# Patient Record
Sex: FEMALE | Race: WHITE
[De-identification: names, ages, dates, MRNs, and addresses within clinical notes are randomized per-mention and may not be internally consistent; named-entity substitution may affect disease eponyms.]

---

## 2019-03-04 ENCOUNTER — HOSPITAL ENCOUNTER (INPATIENT)
Dept: HOSPITAL 56 - MW.OB | Age: 24
LOS: 2 days | Discharge: HOME | End: 2019-03-06
Attending: OBSTETRICS & GYNECOLOGY | Admitting: OBSTETRICS & GYNECOLOGY
Payer: COMMERCIAL

## 2019-03-04 DIAGNOSIS — N85.8: ICD-10-CM

## 2019-03-04 DIAGNOSIS — G43.909: ICD-10-CM

## 2019-03-04 DIAGNOSIS — E66.01: ICD-10-CM

## 2019-03-04 DIAGNOSIS — O34.211: Primary | ICD-10-CM

## 2019-03-04 DIAGNOSIS — Z3A.39: ICD-10-CM

## 2019-03-04 PROCEDURE — 6A550ZT PHERESIS OF CORD BLOOD STEM CELLS, SINGLE: ICD-10-PCS | Performed by: OBSTETRICS & GYNECOLOGY

## 2019-03-04 NOTE — PCM.PREANE
Preanesthetic Assessment





- Anesthesia/Transfusion/Family Hx


Anesthesia History: Prior Anesthesia Without Reaction


Family History of Anesthesia Reaction: No


Transfusion History: No Prior Transfusion(s)


Intubation History: Unknown





- Review of Systems


General: No Symptoms


Pulmonary: No Symptoms


Cardiovascular: No Symptoms


Gastrointestinal: No Symptoms


Neurological: No Symptoms


Other: Reports: None





- Physical Assessment


Height: 1.6 m


Weight: 126.552 kg


ASA Class: 2


Mental Status: Alert & Oriented x3


Airway Class: Mallampati = 2


Dentition: Reports: Normal Dentition, Crown(s) (x1 lower right)


Thyro-Mental Finger Breadths: 3


Mouth Opening Finger Breadths: 3


ROM/Head Extension: Full


Lungs: Clear to Auscultation, Normal Respiratory Effort


Cardiovascular: Regular Rate, Regular Rhythm





- Lab


Values: 





 Laboratory Last Values











WBC  8.06 K/uL (4.0-11.0)   19  13:31    


 


RBC  4.27 M/uL (4.30-5.90)  L  19  13:31    


 


Hgb  11.5 g/dL (12.0-16.0)  L  19  13:31    


 


Hct  35.2 % (36.0-46.0)  L  19  13:31    


 


MCV  82.4 fL (80.0-98.0)   19  13:31    


 


MCH  26.9 pg (27.0-32.0)  L  19  13:31    


 


MCHC  32.7 g/dL (31.0-37.0)   19  13:31    


 


RDW Std Deviation  41.2 fl (28.0-62.0)   19  13:31    


 


RDW Coeff of Patricio  14 % (11.0-15.0)   19  13:31    


 


Plt Count  231 K/uL (150-400)   19  13:31    


 


MPV  11.00 fL (7.40-12.00)   19  13:31    


 


Nucleated RBC %  0.0 /100WBC  19  13:31    


 


Nucleated RBCs #  0 K/uL  19  13:31    


 


Blood Type  O POSITIVE   19  13:31    


 


Antibody Screen  NEGATIVE   19  13:31    














- Allergies


Allergies/Adverse Reactions: 


 Allergies











Allergy/AdvReac Type Severity Reaction Status Date / Time


 


No Known Allergies Allergy   Verified 19 12:09














- Blood


Blood Available: No





- Anesthesia Plan


Pre-Op Medication Ordered: None





- Acknowledgements


Anesthesia Type Planned: Spinal


Pt an Appropriate Candidate for the Planned Anesthesia: Yes


Alternatives and Risks of Anesthesia Discussed w Pt/Guardian: Yes


Pt/Guardian Understands and Agrees with Anesthesia Plan: Yes





PreAnesthesia Questionnaire





- Past Health History


Medical/Surgical History: Denies Medical/Surgical History


Gastrointestinal History: Reports: Other (See Below)


Other Gastrointestinal History: occasional heartburn with pregnancy


OB/GYN History: Reports: Pregnancy


Endocrine/Metabolic History: Reports: Obesity/BMI 30+





- Past Surgical History


Head Surgeries/Procedures: Reports: None


Female  Surgical History: Reports:  Section





- SUBSTANCE USE


Smoking Status *Q: Never Smoker


Second Hand Smoke Exposure: No


Recreational Drug Use History: No





- HOME MEDS


Home Medications: 


 Home Meds





PNV95/Ferrous Fumarate/FA [Prenatal Vitamin Tablet] 1 tab PO DAILY 19 [

History]











- CURRENT (IN HOUSE) MEDS


Current Meds: 





 Current Medications





Lactated Ringer's (Ringers, Lactated)  1,000 mls @ 500 mls/hr IV BOLUS ROBERT


   Last Admin: 19 06:56 Dose:  500 mls/hr


Oxytocin/Sodium Chloride (Oxytocin 30 Unit/500 Ml-Ns)  30 unit in 500 mls @ 250 

mls/hr IV TITRATE ROBERT


Sodium Chloride (Saline Flush)  10 ml FLUSH ASDIRECTED PRN


   PRN Reason: Keep Vein Open


Sodium Chloride (Saline Flush)  2.5 ml FLUSH ASDIRECTED PRN


   PRN Reason: Keep Vein Open





Discontinued Medications





Citric Acid/Sodium Citrate (Bicitra Solution)  30 ml PO ONETIME ONE


   Stop: 19 05:38


Cefazolin Sodium/Dextrose 2 gm (/ Premix)  50 mls @ 100 mls/hr IV ONETIME ONE


   Stop: 19 06:06

## 2019-03-04 NOTE — PCM.OPNOTE
- General Post-Op/Procedure Note


Date of Surgery/Procedure: 19


Operative Procedure(s): repeat low transverse 


Findings: 


Liveborn male 8/9 weight 3540 grams, normal pelvis





Pre Op Diagnosis: 39 5/7 weeks, prior , decline VTOL


Post-Op Diagnosis: Same


Anesthesia Technique: Spinal


Primary Surgeon: Génesis Spivey


Anesthesia Provider: Andrew Marcano


Assistant: Nicki Downing


Pathology: 





none





Fluid Replacement, Intraop: 2,225


Output, Urine Amount: 150


EBL in mLs: 700


Complications: None


Condition: Stable

## 2019-03-05 NOTE — PCM.PNPP
<RoxannaDulce - Last Filed: 19 08:03>





- General Info


Date of Service: 19


Functional Status: Reports: Pain Controlled, Tolerating Diet, Ambulating, 

Urinating





- Review of Systems


General: Denies: Fever, Weakness, Fatigue


Pulmonary: Denies: Shortness of Breath, Pleuritic Chest Pain, Cough


Cardiovascular: Denies: Chest Pain, Palpitations, Dyspnea on Exertion


Gastrointestinal: Denies: Abdominal Pain


Genitourinary: Denies: Dysuria





- General Info


Date of Service: 19





- Patient Data


Vital Signs - Most Recent: 


 Last Vital Signs











Temp  36.6 C   19 04:10


 


Pulse  90   19 06:00


 


Resp  18   19 07:00


 


BP  113/63   19 04:10


 


Pulse Ox  97   19 07:00











Weight - Most Recent: 126.552 kg


I&O - Last 24 Hours: 


 Intake & Output











 19





 22:59 06:59 14:59


 


Intake Total 1837  


 


Output Total 450 1800 


 


Balance 1387 -1800 











Lab Results - Last 24 Hours: 


 Laboratory Results - last 24 hr











  19 Range/Units





  08:24 05:00 


 


Hgb   10.3 L  (12.0-16.0)  g/dL


 


Hct   30.8 L  (36.0-46.0)  %


 


Cord ABG pH  7.300   (7.18-7.38)  


 


Cord ABG Base Excess  -5   (-10--2)  


 


Cord VBG pH  7.353   (7.25-7.45)  


 


Cord VBG Base Excess  -5   (-10--2)  











Med Orders - Current: 


 Current Medications





Acetaminophen (Tylenol)  650 mg PO Q4H PRN


   PRN Reason: Pain


   Last Admin: 19 06:17 Dose:  650 mg


Bisacodyl (Dulcolax)  10 mg RECTAL ONETIME PRN


   PRN Reason: Constipation


Diphenhydramine HCl (Benadryl)  25 mg IVPUSH Q6H PRN


   PRN Reason: Itching or Nausea


Docusate Sodium (Colace)  100 mg PO BID ROBERT


   Last Admin: 19 21:14 Dose:  100 mg


Emollient Ointment (Lansinoh Hpa)  0 gm TOP ASDIRECTED PRN


   PRN Reason: Sore Nipples


Fentanyl (Sublimaze)  50 mcg IVPUSH Q1H PRN


   PRN Reason: Pain (severe 7-10)


Fentanyl (Sublimaze)  50 mcg IVPUSH Q5M PRN


   PRN Reason: Pain (severe 7-10)


   Stop: 19 08:49


Lactated Ringer's (Ringers, Lactated)  1,000 mls @ 125 mls/hr IV ASDIRECTED ROBERT


   Last Admin: 19 13:06 Dose:  125 mls/hr


Ibuprofen (Motrin)  800 mg PO Q8H PRN


   PRN Reason: mild pain or fever


   Last Admin: 19 04:24 Dose:  800 mg


Ondansetron HCl (Zofran)  4 mg IVPUSH Q6H PRN


   PRN Reason: Nausea


Ondansetron HCl (Zofran)  4 mg IVPUSH Q4H PRN


   PRN Reason: Nausea/Vomiting


Oxycodone HCl (Oxycodone)  5 mg PO Q2H PRN


   PRN Reason: Pain


Oxycodone/Acetaminophen (Percocet 325-5 Mg)  2 tab PO Q6H PRN


   PRN Reason: Pain (moderate 4-6)


Sodium Chloride (Saline Flush)  10 ml FLUSH ASDIRECTED PRN


   PRN Reason: Keep Vein Open


Sodium Chloride (Saline Flush)  2.5 ml FLUSH ASDIRECTED PRN


   PRN Reason: Keep Vein Open


Sodium Chloride (Normal Saline)  10 ml IV ASDIRECTED PRN


   PRN Reason: Other





Discontinued Medications





Citric Acid/Sodium Citrate (Bicitra Solution)  30 ml PO ONETIME ONE


   Stop: 19 05:38


   Last Admin: 19 16:38 Dose:  Not Given


Citric Acid/Sodium Citrate (Bicitra Solution) Confirm Administered Dose 30 ml 

.ROUTE .STK-MED ONE


   Stop: 19 07:50


   Last Admin: 19 07:48 Dose:  30 ml


Diphenhydramine HCl (Benadryl)  25 mg IVPUSH Q4H PRN


   PRN Reason: Itching


   Stop: 19 07:11


Enoxaparin Sodium (Lovenox)  30 mg SUBCUT ONETIME ONE


   Stop: 19 15:01


   Last Admin: 19 14:51 Dose:  30 mg


Hydromorphone HCl (Dilaudid)  2 mg IVPUSH ONETIME ONE


   Stop: 19 08:50


   Last Admin: 19 16:37 Dose:  Not Given


Cefazolin Sodium/Dextrose 2 gm (/ Premix)  50 mls @ 100 mls/hr IV ONETIME ONE


   Stop: 19 06:06


   Last Admin: 19 16:38 Dose:  Not Given


Lactated Ringer's (Ringers, Lactated)  1,000 mls @ 500 mls/hr IV BOLUS ROBERT


   Last Admin: 19 06:56 Dose:  500 mls/hr


Oxytocin/Sodium Chloride (Oxytocin 30 Unit/500 Ml-Ns)  30 unit in 500 mls @ 250 

mls/hr IV TITRATE ROBERT


Nalbuphine HCl (Nubain)  5 mg IVPUSH ASDIRECTED PRN


   PRN Reason: Itching


   Last Admin: 19 16:34 Dose:  5 mg


Octyl Cyanoacrylate (Dermabond Advance) Confirm Administered Dose 2 applic 

.ROUTE .STK-MED ONE


   Stop: 19 07:42


Sodium Chloride (Saline Flush)  10 ml FLUSH ASDIRECTED PRN


   PRN Reason: Keep Vein Open


Sodium Chloride (Saline Flush)  2.5 ml FLUSH ASDIRECTED PRN


   PRN Reason: Keep Vein Open











- Infant Interaction


Infant Disposition, Postpartum:  in Room with Family


Infant Feeding:  Infant; Nursed Well


Support Person: 





- Postpartum Recovery Exam


Fundal Tone: Firm


Fundal Level: 1 Fingerbreadths Below Umbilicus


Fundal Placement: Midline


Lochia Amount: Small


Lochia Color: Rubra/Red


Perineum Description: Intact, Minimal Bruising/Swelling


Episiotomy/Laceration: None


Bladder Status: Indwelling Catheter in Place


Urinary Elimination: Indwelling Catheter





- Exam


General: Alert, Oriented


Neck: Supple


Lungs: Clear to Auscultation, Normal Respiratory Effort


Cardiovascular: Regular Rate, Regular Rhythm


GI/Abdominal Exam: Normal Bowel Sounds, Soft, Non-Tender, No Mass


Extremities: Normal Inspection, Non-Tender, Normal Capillary Refill, Pedal 

Edema (trace)


Skin: Warm, Dry, Intact





- Problem List & Annotations


(1)  delivery delivered


SNOMED Code(s): 797369395


   Code(s): O82 - ENCOUNTER FOR  DELIVERY WITHOUT INDICATION   Status: 

Acute   Current Visit: Yes   





- Problem List Review


Problem List Initiated/Reviewed/Updated: Yes





- Assessment


Assessment:: 


POD #1 s/p RLTCS. Minimal pain and lochia. Breast feeding well. Encouraged 

patient to shower and ambulate halls.








- Plan


Plan:: 


Continue routine post-op cares. Anticipate discharge home tomorrow.








<Génesis Spivey - Last Filed: 19 10:04>





- Patient Data


Vital Signs - Most Recent: 


 Last Vital Signs











Temp  36.4 C   19 07:30


 


Pulse  87   19 07:30


 


Resp  18   19 07:30


 


BP  123/68   19 07:30


 


Pulse Ox  96   19 07:30











I&O - Last 24 Hours: 


 Intake & Output











 19





 22:59 06:59 14:59


 


Intake Total 1837  


 


Output Total 450 1800 


 


Balance 1387 -1800 











Lab Results - Last 24 Hours: 


 Laboratory Results - last 24 hr











  19 Range/Units





  05:00 


 


Hgb  10.3 L  (12.0-16.0)  g/dL


 


Hct  30.8 L  (36.0-46.0)  %











Med Orders - Current: 


 Current Medications





Acetaminophen (Tylenol)  650 mg PO Q4H PRN


   PRN Reason: Pain


   Last Admin: 19 06:17 Dose:  650 mg


Bisacodyl (Dulcolax)  10 mg RECTAL ONETIME PRN


   PRN Reason: Constipation


Diphenhydramine HCl (Benadryl)  25 mg IVPUSH Q6H PRN


   PRN Reason: Itching or Nausea


Docusate Sodium (Colace)  100 mg PO BID [facility]


   Last Admin: 19 21:14 Dose:  100 mg


Emollient Ointment (Lansinoh Hpa)  0 gm TOP ASDIRECTED PRN


   PRN Reason: Sore Nipples


Fentanyl (Sublimaze)  50 mcg IVPUSH Q1H PRN


   PRN Reason: Pain (severe 7-10)


Lactated Ringer's (Ringers, Lactated)  1,000 mls @ 125 mls/hr IV ASDIRECTED [facility]


   Last Admin: 19 13:06 Dose:  125 mls/hr


Ibuprofen (Motrin)  800 mg PO Q8H PRN


   PRN Reason: mild pain or fever


   Last Admin: 19 04:24 Dose:  800 mg


Ondansetron HCl (Zofran)  4 mg IVPUSH Q6H PRN


   PRN Reason: Nausea


Ondansetron HCl (Zofran)  4 mg IVPUSH Q4H PRN


   PRN Reason: Nausea/Vomiting


Oxycodone HCl (Oxycodone)  5 mg PO Q2H PRN


   PRN Reason: Pain


Oxycodone/Acetaminophen (Percocet 325-5 Mg)  2 tab PO Q6H PRN


   PRN Reason: Pain (moderate 4-6)


Sodium Chloride (Saline Flush)  10 ml FLUSH ASDIRECTED PRN


   PRN Reason: Keep Vein Open


Sodium Chloride (Saline Flush)  2.5 ml FLUSH ASDIRECTED PRN


   PRN Reason: Keep Vein Open


Sodium Chloride (Normal Saline)  10 ml IV ASDIRECTED PRN


   PRN Reason: Other





Discontinued Medications





Bupivacaine HCl (Sensorcaine-Mpf 0.25%) Confirm Administered Dose 10 ml .ROUTE 

.STK-MED ONE


   Stop: 19 09:33


Citric Acid/Sodium Citrate (Bicitra Solution)  30 ml PO ONETIME ONE


   Stop: 19 05:38


   Last Admin: 19 16:38 Dose:  Not Given


Citric Acid/Sodium Citrate (Bicitra Solution) Confirm Administered Dose 30 ml 

.ROUTE .STK-MED ONE


   Stop: 19 07:50


   Last Admin: 19 07:48 Dose:  30 ml


Diphenhydramine HCl (Benadryl)  25 mg IVPUSH Q4H PRN


   PRN Reason: Itching


   Stop: 19 07:11


Enoxaparin Sodium (Lovenox)  30 mg SUBCUT ONETIME ONE


   Stop: 19 15:01


   Last Admin: 19 14:51 Dose:  30 mg


Fentanyl (Sublimaze)  50 mcg IVPUSH Q5M PRN


   PRN Reason: Pain (severe 7-10)


   Stop: 19 08:49


Hydromorphone HCl (Dilaudid)  2 mg IVPUSH ONETIME ONE


   Stop: 19 08:50


   Last Admin: 19 16:37 Dose:  Not Given


Cefazolin Sodium/Dextrose 2 gm (/ Premix)  50 mls @ 100 mls/hr IV ONETIME ONE


   Stop: 19 06:06


   Last Admin: 19 16:38 Dose:  Not Given


Lactated Ringer's (Ringers, Lactated)  1,000 mls @ 500 mls/hr IV BOLUS ROBERT


   Last Admin: 19 06:56 Dose:  500 mls/hr


Oxytocin/Sodium Chloride (Oxytocin 30 Unit/500 Ml-Ns)  30 unit in 500 mls @ 250 

mls/hr IV TITRATE ROBERT


Nalbuphine HCl (Nubain)  5 mg IVPUSH ASDIRECTED PRN


   PRN Reason: Itching


   Last Admin: 19 16:34 Dose:  5 mg


Octyl Cyanoacrylate (Dermabond Advance) Confirm Administered Dose 2 applic 

.ROUTE .STK-MED ONE


   Stop: 19 07:42


Ropivacaine (Naropin 0.2%) Confirm Administered Dose 20 ml .ROUTE .STK-MED ONE


   Stop: 19 09:32


Sodium Chloride (Saline Flush)  10 ml FLUSH ASDIRECTED PRN


   PRN Reason: Keep Vein Open


Sodium Chloride (Saline Flush)  2.5 ml FLUSH ASDIRECTED PRN


   PRN Reason: Keep Vein Open











- Problem List Review


Problem List Initiated/Reviewed/Updated: Yes





- My Orders


Last 24 Hours: 


My Active Orders





19 09:19


Bisacodyl [Dulcolax]   10 mg RECTAL ONETIME PRN 


Ibuprofen [Motrin]   800 mg PO Q8H PRN 


Lanolin [Lansinoh HPA]   See Dose Instructions  TOP ASDIRECTED PRN 


Ondansetron [Zofran]   4 mg IVPUSH Q4H PRN 


diphenhydrAMINE [Benadryl]   25 mg IVPUSH Q6H PRN 


Abdominal Binder [OM.PC] Urgent 


Resuscitation Status Routine 





19 09:20


Patient Status [ADT] Routine 


Ambulate [RC] PER UNIT ROUTINE 


Antiembolic Devices [RC] PER UNIT ROUTINE 


Communication Order [RC] PER UNIT ROUTINE 


Communication Order [RC] Per Unit Routine 


Intake and Output [RC] Q12H 


May Shower [RC] ASDIRECTED 


Notify Provider Vital Signs [RC] ASDIRECTED 


RT Incentive Spirometry [RC] Q2HWA 


Vital Signs [RC] PER UNIT ROUTINE 


Assess Lochia [WOMSER] Per Unit Routine 


Assess Uterine Involution [WOMSER] Per Unit Routine 


Breast Pump [WOMSER] Per Unit Routine 


Peripheral IV Discontinue [OM.PC] Routine 


Sequential Compression Device [OM.PC] Per Unit Routine 





19 09:22


Notify Provider Intake and Out [RC] ASDIRECTED 





19 09:25


Acetaminophen [Tylenol]   650 mg PO Q4H PRN 


oxyCODONE   5 mg PO Q2H PRN 





19 09:30


Lactated Ringers [Ringers, Lactated] 1,000 ml IV ASDIRECTED 





19 21:00


Docusate Sodium [Colace]   100 mg PO BID 





19 Dinner


Regular Diet [DIET] 





19 Lunch


Regular Diet [DIET] 














- Assessment


Assessment:: 





patient was seen and examined and I agree with above.

## 2019-03-05 NOTE — OR
SURGEON:

Génesis Spivey M.D.

 

DATE OF PROCEDURE:  2019

 

PREOPERATIVE DIAGNOSES:

1. 39 and 5/7 week intrauterine pregnancy.

2. Prior  delivery, declines vaginal trial of labor.

 

POSTOPERATIVE DIAGNOSES:

1. 39 and 5/7 week intrauterine pregnancy.

2. Prior  delivery, declines vaginal trial of labor.

 

PROCEDURE:

Repeat low-transverse  section.

 

ANESTHESIA:

Spinal.

 

ESTIMATED BLOOD LOSS:

700 mL.

 

FLUIDS:

2225 mL crystalloid.  Urine output 150 mL.

 

FINDINGS:

Live-born male.  Apgar scores 8 and 9, weighing 3540 g.  Normal-appearing

uterus, tubes, and ovaries.

 

COMPLICATIONS:

None known.

 

DISPOSITION:

Stable to recovery.

 

BRIEF HISTORY:

This is a 23-year-old female.  She is G2, P1-0-0-1.  She presents at 39 and 5/7

weeks gestation for a repeat  delivery.  She has had uncomplicated

prenatal care, except for morbid obesity.  She was consented, including risks of

bleeding; infection; injury to bowel, bladder, blood vessels, ureters, or other

organs; risk of thromboembolic event; risk of anesthesia.  Understanding all

these risks, she does desire to proceed.

 

DESCRIPTION OF PROCEDURE:

With the patient in left tilt position, under adequate spinal analgesia, the

abdomen was prepped with chlorhexidine and draped in usual fashion for abdominal

surgery.  SCDs were in place.  She had received 2 g of Ancef IV and an

appropriate time-out was held.  After documentation of adequate analgesia, the

prior cicatrix was excised and a scalpel was used to incise transversely 2 cm

cephalad from the pubic symphysis through subcutaneous tissue to the fascia,

which was scored transversely in the midline.  The fascial incision was extended

laterally using curved Flores scissors.  The fascia was elevated from the

underlying rectus muscle using sharp and blunt dissection.  The rectus muscles

were  in the midline.  A finger was used to enter the peritoneal

cavity.  There were no anterior adhesions.  Therefore, the incision was extended

using sharp and blunt dissection.  The Alexx O  retractor was placed.

The visceroperitoneum over the lower uterine segment was incised and an adequate

bladder flap was developed.  A transverse curvilinear incision was made over the

lower uterine segment.  The amniotic cavity was entered, clear fluid was noted.

The incision was extended using blunt dissection.  The fetal head was delivered

via the uterine incision with fundal pressure.  The infant was bulb suctioned by

nose and mouth, and the remainder of the infant's body was delivered.  The cord

was doubly clamped and cut, and the infant was handed to the nurse in attendance

at delivery.  The infant is a liveborn male, Apgar scores 8 and 9, weighing 3540

g.  Cord blood was collected for cord ABGs, as well as routine cord blood

sampling.  The placenta was removed by manual extraction.  The cervix was opened

with a ring forceps.  The uterine incision was closed with running lock suture

of 0 Polysorb, followed by an imbricating layer of 0 Polysorb.  Tubes and

ovaries were inspected and were normal.  The uterine incision was inspected and

1 additional suture was placed for complete hemostasis.  The uterine incision

was completely hemostatic; therefore, the Alexx O  retractor was

removed.  The uterine incision was again carefully inspected and was hemostatic.

The fundus was firm, and therefore, the rectus muscle and peritoneum were

loosely approximated in the midline using a running mattress suture of 0

Polysorb.  Posterior aspect of the fascia was inspected and any areas of

bleeding that were noted were cauterized.  The fascial incision was closed with

a running suture of 0 Polysorb.  Subcutaneous tissue was irrigated.  Any areas

of bleeding that were noted were cauterized, and the deep subcutaneous tissue

was closed with a running suture of 3-0 plain.  The skin was closed with

subcuticular suture of 3-0 Monocryl followed by Dermabond.  Final sponge,

needle, and instrument counts were reported as correct.  There were no

complications.  Mother and baby are in recovery in good condition.

 

 

ISABELLE / NICHOL

DD:  2019 09:29:[id number]

DT:  2019 15:42:43

Job #:  [id number]

## 2019-03-06 NOTE — PCM.DCSUM1
**Discharge Summary





- Hospital Course


Diagnosis: Stroke: No





- Discharge Data


Discharge Disposition: Home, Self-Care 01


Condition: Stable





- Discharge Diagnosis/Problem(s)


(1)  delivery delivered


SNOMED Code(s): 952337779


   ICD Code: O82 - ENCOUNTER FOR  DELIVERY WITHOUT INDICATION   Status: 

Acute   Current Visit: Yes   





- Patient Summary/Data


Operative Procedure(s) Performed: repeat low transverse 





- Patient Instructions


Diet: Heart Healthy Diet, Regular Diet as Tolerated


Activity: No Lifting Over 10 Pounds, No Strenuous Activities


Driving: Do Not Drive


Showering/Bathing: May Shower


Wound/Incision Care: Keep Operative Site/Wound Site Clean and Dry


Notify Provider of: Fever, Increased Pain, Swelling and Redness


Other/Special Instructions: Notify of bleeding greater than a pad per hour x 2 

hours.  Nothing per vagina for 6 weeks.  Use meds as discussed with Dr Spivey.





- Discharge Plan


*PRESCRIPTION DRUG MONITORING PROGRAM REVIEWED*: Not Applicable


*COPY OF PRESCRIPTION DRUG MONITORING REPORT IN PATIENT JACKIE: Not Applicable


Home Medications: 


 Home Meds





PNV95/Ferrous Fumarate/FA [Prenatal Vitamin Tablet] 1 tab PO DAILY 19 [

History]








Patient Handouts:  Postpartum Care After  Delivery


Referrals: 


[facility] [Outside]


Génesis Spivey MD [Physician] - 


(2 week-  @ 2:45pm w/ Dr. Spivey


 week-  @ 11:15am w/ Dr. Spivey


)





- Discharge Summary/Plan Comment


DC Time >30 min.: No





- Patient Data


Vitals - Most Recent: 


 Last Vital Signs











Temp  36.8 C   19 07:40


 


Pulse  82   19 07:40


 


Resp  16   19 07:40


 


BP  121/66   19 07:40


 


Pulse Ox  98   19 07:40











Weight - Most Recent: 126.552 kg


Med Orders - Current: 


 Current Medications





Acetaminophen (Tylenol)  650 mg PO Q4H PRN


   PRN Reason: Pain


   Last Admin: 19 16:00 Dose:  650 mg


Bisacodyl (Dulcolax)  10 mg RECTAL ONETIME PRN


   PRN Reason: Constipation


Diphenhydramine HCl (Benadryl)  25 mg IVPUSH Q6H PRN


   PRN Reason: Itching or Nausea


Docusate Sodium (Colace)  100 mg PO BID [facility]


   Last Admin: 19 08:13 Dose:  100 mg


Emollient Ointment (Lansinoh Hpa)  0 gm TOP ASDIRECTED PRN


   PRN Reason: Sore Nipples


   Last Admin: 19 06:38 Dose:  7 gm


Fentanyl (Sublimaze)  50 mcg IVPUSH Q1H PRN


   PRN Reason: Pain (severe 7-10)


Lactated Ringer's (Ringers, Lactated)  1,000 mls @ 125 mls/hr IV ASDIRECTED [facility]


   Last Admin: 19 13:06 Dose:  125 mls/hr


Ibuprofen (Motrin)  800 mg PO Q8H PRN


   PRN Reason: mild pain or fever


   Last Admin: 19 08:13 Dose:  800 mg


Ondansetron HCl (Zofran)  4 mg IVPUSH Q6H PRN


   PRN Reason: Nausea


Ondansetron HCl (Zofran)  4 mg IVPUSH Q4H PRN


   PRN Reason: Nausea/Vomiting


Oxycodone HCl (Oxycodone)  5 mg PO Q2H PRN


   PRN Reason: Pain


   Last Admin: 19 03:44 Dose:  5 mg


Oxycodone/Acetaminophen (Percocet 325-5 Mg)  2 tab PO Q6H PRN


   PRN Reason: Pain (moderate 4-6)


Sodium Chloride (Saline Flush)  10 ml FLUSH ASDIRECTED PRN


   PRN Reason: Keep Vein Open


Sodium Chloride (Saline Flush)  2.5 ml FLUSH ASDIRECTED PRN


   PRN Reason: Keep Vein Open


Sodium Chloride (Normal Saline)  10 ml IV ASDIRECTED PRN


   PRN Reason: Other





Discontinued Medications





Bupivacaine HCl (Sensorcaine-Mpf 0.25%) Confirm Administered Dose 10 ml .ROUTE 

.STK-MED ONE


   Stop: 19 09:33


   Last Admin: 19 07:34 Dose:  Not Given


Citric Acid/Sodium Citrate (Bicitra Solution)  30 ml PO ONETIME ONE


   Stop: 19 05:38


   Last Admin: 19 16:38 Dose:  Not Given


Citric Acid/Sodium Citrate (Bicitra Solution) Confirm Administered Dose 30 ml 

.ROUTE .STK-MED ONE


   Stop: 19 07:50


   Last Admin: 19 07:48 Dose:  30 ml


Diphenhydramine HCl (Benadryl)  25 mg IVPUSH Q4H PRN


   PRN Reason: Itching


   Stop: 19 07:11


Enoxaparin Sodium (Lovenox)  30 mg SUBCUT ONETIME ONE


   Stop: 19 15:01


   Last Admin: 19 14:51 Dose:  30 mg


Fentanyl (Sublimaze)  50 mcg IVPUSH Q5M PRN


   PRN Reason: Pain (severe 7-10)


   Stop: 19 08:49


Hydromorphone HCl (Dilaudid)  2 mg IVPUSH ONETIME ONE


   Stop: 19 08:50


   Last Admin: 19 16:37 Dose:  Not Given


Cefazolin Sodium/Dextrose 2 gm (/ Premix)  50 mls @ 100 mls/hr IV ONETIME ONE


   Stop: 19 06:06


   Last Admin: 19 16:38 Dose:  Not Given


Lactated Ringer's (Ringers, Lactated)  1,000 mls @ 500 mls/hr IV BOLUS ROBERT


   Last Admin: 19 06:56 Dose:  500 mls/hr


Oxytocin/Sodium Chloride (Oxytocin 30 Unit/500 Ml-Ns)  30 unit in 500 mls @ 250 

mls/hr IV TITRATE ROBERT


Nalbuphine HCl (Nubain)  5 mg IVPUSH ASDIRECTED PRN


   PRN Reason: Itching


   Last Admin: 19 16:34 Dose:  5 mg


Octyl Cyanoacrylate (Dermabond Advance) Confirm Administered Dose 2 applic 

.ROUTE .STK-MED ONE


   Stop: 19 07:42


Ropivacaine (Naropin 0.2%) Confirm Administered Dose 20 ml .ROUTE .STK-MED ONE


   Stop: 19 09:32


   Last Admin: 19 07:34 Dose:  Not Given


Sodium Chloride (Saline Flush)  10 ml FLUSH ASDIRECTED PRN


   PRN Reason: Keep Vein Open


Sodium Chloride (Saline Flush)  2.5 ml FLUSH ASDIRECTED PRN


   PRN Reason: Keep Vein Open

## 2019-03-06 NOTE — PCM.PNPP
- General Info


Functional Status: Reports: Pain Controlled, Tolerating Diet, Ambulating, 

Urinating





- Patient Data


Vital Signs - Most Recent: 


 Last Vital Signs











Temp  36.8 C   19 07:40


 


Pulse  82   19 07:40


 


Resp  16   19 07:40


 


BP  121/66   19 07:40


 


Pulse Ox  98   19 07:40











Weight - Most Recent: 126.552 kg


Med Orders - Current: 


 Current Medications





Acetaminophen (Tylenol)  650 mg PO Q4H PRN


   PRN Reason: Pain


   Last Admin: 19 16:00 Dose:  650 mg


Bisacodyl (Dulcolax)  10 mg RECTAL ONETIME PRN


   PRN Reason: Constipation


Diphenhydramine HCl (Benadryl)  25 mg IVPUSH Q6H PRN


   PRN Reason: Itching or Nausea


Docusate Sodium (Colace)  100 mg PO BID ROBERT


   Last Admin: 19 08:13 Dose:  100 mg


Emollient Ointment (Lansinoh Hpa)  0 gm TOP ASDIRECTED PRN


   PRN Reason: Sore Nipples


   Last Admin: 19 06:38 Dose:  7 gm


Fentanyl (Sublimaze)  50 mcg IVPUSH Q1H PRN


   PRN Reason: Pain (severe 7-10)


Lactated Ringer's (Ringers, Lactated)  1,000 mls @ 125 mls/hr IV ASDIRECTED ROBERT


   Last Admin: 19 13:06 Dose:  125 mls/hr


Ibuprofen (Motrin)  800 mg PO Q8H PRN


   PRN Reason: mild pain or fever


   Last Admin: 19 08:13 Dose:  800 mg


Ondansetron HCl (Zofran)  4 mg IVPUSH Q6H PRN


   PRN Reason: Nausea


Ondansetron HCl (Zofran)  4 mg IVPUSH Q4H PRN


   PRN Reason: Nausea/Vomiting


Oxycodone HCl (Oxycodone)  5 mg PO Q2H PRN


   PRN Reason: Pain


   Last Admin: 19 03:44 Dose:  5 mg


Oxycodone/Acetaminophen (Percocet 325-5 Mg)  2 tab PO Q6H PRN


   PRN Reason: Pain (moderate 4-6)


Sodium Chloride (Saline Flush)  10 ml FLUSH ASDIRECTED PRN


   PRN Reason: Keep Vein Open


Sodium Chloride (Saline Flush)  2.5 ml FLUSH ASDIRECTED PRN


   PRN Reason: Keep Vein Open


Sodium Chloride (Normal Saline)  10 ml IV ASDIRECTED PRN


   PRN Reason: Other





Discontinued Medications





Bupivacaine HCl (Sensorcaine-Mpf 0.25%) Confirm Administered Dose 10 ml .ROUTE 

.STK-MED ONE


   Stop: 19 09:33


   Last Admin: 19 07:34 Dose:  Not Given


Citric Acid/Sodium Citrate (Bicitra Solution)  30 ml PO ONETIME ONE


   Stop: 19 05:38


   Last Admin: 19 16:38 Dose:  Not Given


Citric Acid/Sodium Citrate (Bicitra Solution) Confirm Administered Dose 30 ml 

.ROUTE .STK-MED ONE


   Stop: 19 07:50


   Last Admin: 19 07:48 Dose:  30 ml


Diphenhydramine HCl (Benadryl)  25 mg IVPUSH Q4H PRN


   PRN Reason: Itching


   Stop: 19 07:11


Enoxaparin Sodium (Lovenox)  30 mg SUBCUT ONETIME ONE


   Stop: 19 15:01


   Last Admin: 19 14:51 Dose:  30 mg


Fentanyl (Sublimaze)  50 mcg IVPUSH Q5M PRN


   PRN Reason: Pain (severe 7-10)


   Stop: 19 08:49


Hydromorphone HCl (Dilaudid)  2 mg IVPUSH ONETIME ONE


   Stop: 19 08:50


   Last Admin: 19 16:37 Dose:  Not Given


Cefazolin Sodium/Dextrose 2 gm (/ Premix)  50 mls @ 100 mls/hr IV ONETIME ONE


   Stop: 19 06:06


   Last Admin: 19 16:38 Dose:  Not Given


Lactated Ringer's (Ringers, Lactated)  1,000 mls @ 500 mls/hr IV BOLUS ROBERT


   Last Admin: 19 06:56 Dose:  500 mls/hr


Oxytocin/Sodium Chloride (Oxytocin 30 Unit/500 Ml-Ns)  30 unit in 500 mls @ 250 

mls/hr IV TITRATE ROBERT


Nalbuphine HCl (Nubain)  5 mg IVPUSH ASDIRECTED PRN


   PRN Reason: Itching


   Last Admin: 19 16:34 Dose:  5 mg


Octyl Cyanoacrylate (Dermabond Advance) Confirm Administered Dose 2 applic 

.ROUTE .STK-MED ONE


   Stop: 19 07:42


Ropivacaine (Naropin 0.2%) Confirm Administered Dose 20 ml .ROUTE .STK-MED ONE


   Stop: 19 09:32


   Last Admin: 19 07:34 Dose:  Not Given


Sodium Chloride (Saline Flush)  10 ml FLUSH ASDIRECTED PRN


   PRN Reason: Keep Vein Open


Sodium Chloride (Saline Flush)  2.5 ml FLUSH ASDIRECTED PRN


   PRN Reason: Keep Vein Open











- Infant Interaction


Infant Disposition, Postpartum:  in Room with Family


Infant Feeding:  Infant; Nursed Well


Support Person: 





- Postpartum Recovery Exam


Fundal Tone: Firm


Fundal Level: 1 Fingerbreadths Below Umbilicus


Fundal Placement: Midline


Lochia Amount: Small


Lochia Color: Rubra/Red


Perineum Description: Intact, Minimal Bruising/Swelling


Episiotomy/Laceration: None


Bladder Status: Voiding


Urinary Elimination: Indwelling Catheter





- Exam


General: Alert, Oriented


Lungs: Clear to Auscultation, Normal Respiratory Effort


Cardiovascular: Regular Rate


GI/Abdominal Exam: Normal Bowel Sounds, Soft, Non-Tender, No Distention


Extremities: Non-Tender, No Pedal Edema


Skin: Warm, Dry, Intact


Psy/Mental Status: Alert, Normal Affect, Normal Mood





- Problem List & Annotations


(1)  delivery delivered


SNOMED Code(s): 591385278


   Code(s): O82 - ENCOUNTER FOR  DELIVERY WITHOUT INDICATION   Status: 

Acute   Current Visit: Yes   





- Problem List Review


Problem List Initiated/Reviewed/Updated: Yes

## 2019-03-06 NOTE — PCM.DCSUM1
**Discharge Summary





- Hospital Course


Free Text/Narrative:: 





Pt is POD#2 from repeat .  Exam is normal.  Instructions given.  she 

requests discharge today.  She requests OTC medications only.


Diagnosis: Stroke: No





- Discharge Data


Discharge Date: 19


Discharge Disposition: Home, Self-Care 01


Condition: Stable





- Discharge Diagnosis/Problem(s)


(1)  delivery delivered


SNOMED Code(s): 086105033


   ICD Code: O82 - ENCOUNTER FOR  DELIVERY WITHOUT INDICATION   Status: 

Acute   





- Patient Summary/Data


Operative Procedure(s) Performed: repeat low transverse 





- Patient Instructions


Diet: Heart Healthy Diet, Regular Diet as Tolerated


Activity: No Lifting Over 10 Pounds, No Strenuous Activities


Driving: Do Not Drive


Showering/Bathing: May Shower


Wound/Incision Care: Keep Operative Site/Wound Site Clean and Dry


Notify Provider of: Fever, Increased Pain, Swelling and Redness


Other/Special Instructions: Notify of bleeding greater than a pad per hour x 2 

hours.  Nothing per vagina for 6 weeks.  Use meds as discussed with Dr Spivey.





- Discharge Plan


*PRESCRIPTION DRUG MONITORING PROGRAM REVIEWED*: Not Applicable


*COPY OF PRESCRIPTION DRUG MONITORING REPORT IN PATIENT JACKIE: Not Applicable


Home Medications: 


 Home Meds





PNV95/Ferrous Fumarate/FA [Prenatal Vitamin Tablet] 1 tab PO DAILY 19 [

History]








Patient Handouts:  Home Care Instructions for Mom, Postpartum Care After 

 Delivery


Referrals: 


[facility] [Outside]


Génesis Spivey MD [Physician] - 


( week-  @ 2:45pm w/ Dr. Spivey


6 week-  @ 11:15am w/ Dr. Spivey


)





- Discharge Summary/Plan Comment


DC Time >30 min.: No





- General Info


Date of Service: 19





- Review of Systems


General: Reports: No Symptoms


HEENT: Reports: No Symptoms


Pulmonary: Reports: No Symptoms


Cardiovascular: Reports: No Symptoms


Gastrointestinal: Reports: No Symptoms


Genitourinary: Reports: No Symptoms


Musculoskeletal: Reports: No Symptoms


Skin: Reports: No Symptoms


Neurological: Reports: No Symptoms


Psychiatric: Reports: No Symptoms





- Patient Data


Vitals - Most Recent: 


 Last Vital Signs











Temp  36.8 C   19 07:40


 


Pulse  82   19 07:40


 


Resp  16   19 07:40


 


BP  121/66   19 07:40


 


Pulse Ox  98   19 07:40











Weight - Most Recent: 126.552 kg


Med Orders - Current: 


 Current Medications





Acetaminophen (Tylenol)  650 mg PO Q4H PRN


   PRN Reason: Pain


   Last Admin: 19 16:00 Dose:  650 mg


Bisacodyl (Dulcolax)  10 mg RECTAL ONETIME PRN


   PRN Reason: Constipation


Diphenhydramine HCl (Benadryl)  25 mg IVPUSH Q6H PRN


   PRN Reason: Itching or Nausea


Docusate Sodium (Colace)  100 mg PO BID [facility]


   Last Admin: 19 08:13 Dose:  100 mg


Emollient Ointment (Lansinoh Hpa)  0 gm TOP ASDIRECTED PRN


   PRN Reason: Sore Nipples


   Last Admin: 19 06:38 Dose:  7 gm


Fentanyl (Sublimaze)  50 mcg IVPUSH Q1H PRN


   PRN Reason: Pain (severe 7-10)


Lactated Ringer's (Ringers, Lactated)  1,000 mls @ 125 mls/hr IV ASDIRECTED [facility]


   Last Admin: 19 13:06 Dose:  125 mls/hr


Ibuprofen (Motrin)  800 mg PO Q8H PRN


   PRN Reason: mild pain or fever


   Last Admin: 19 08:13 Dose:  800 mg


Ondansetron HCl (Zofran)  4 mg IVPUSH Q6H PRN


   PRN Reason: Nausea


Ondansetron HCl (Zofran)  4 mg IVPUSH Q4H PRN


   PRN Reason: Nausea/Vomiting


Oxycodone HCl (Oxycodone)  5 mg PO Q2H PRN


   PRN Reason: Pain


   Last Admin: 19 03:44 Dose:  5 mg


Oxycodone/Acetaminophen (Percocet 325-5 Mg)  2 tab PO Q6H PRN


   PRN Reason: Pain (moderate 4-6)


Sodium Chloride (Saline Flush)  10 ml FLUSH ASDIRECTED PRN


   PRN Reason: Keep Vein Open


Sodium Chloride (Saline Flush)  2.5 ml FLUSH ASDIRECTED PRN


   PRN Reason: Keep Vein Open


Sodium Chloride (Normal Saline)  10 ml IV ASDIRECTED PRN


   PRN Reason: Other





Discontinued Medications





Bupivacaine HCl (Sensorcaine-Mpf 0.25%) Confirm Administered Dose 10 ml .ROUTE 

.STK-MED ONE


   Stop: 19 09:33


   Last Admin: 19 07:34 Dose:  Not Given


Citric Acid/Sodium Citrate (Bicitra Solution)  30 ml PO ONETIME ONE


   Stop: 19 05:38


   Last Admin: 19 16:38 Dose:  Not Given


Citric Acid/Sodium Citrate (Bicitra Solution) Confirm Administered Dose 30 ml 

.ROUTE .STK-MED ONE


   Stop: 19 07:50


   Last Admin: 19 07:48 Dose:  30 ml


Diphenhydramine HCl (Benadryl)  25 mg IVPUSH Q4H PRN


   PRN Reason: Itching


   Stop: 19 07:11


Enoxaparin Sodium (Lovenox)  30 mg SUBCUT ONETIME ONE


   Stop: 19 15:01


   Last Admin: 19 14:51 Dose:  30 mg


Fentanyl (Sublimaze)  50 mcg IVPUSH Q5M PRN


   PRN Reason: Pain (severe 7-10)


   Stop: 19 08:49


Hydromorphone HCl (Dilaudid)  2 mg IVPUSH ONETIME ONE


   Stop: 19 08:50


   Last Admin: 19 16:37 Dose:  Not Given


Cefazolin Sodium/Dextrose 2 gm (/ Premix)  50 mls @ 100 mls/hr IV ONETIME ONE


   Stop: 19 06:06


   Last Admin: 19 16:38 Dose:  Not Given


Lactated Ringer's (Ringers, Lactated)  1,000 mls @ 500 mls/hr IV BOLUS ROBERT


   Last Admin: 19 06:56 Dose:  500 mls/hr


Oxytocin/Sodium Chloride (Oxytocin 30 Unit/500 Ml-Ns)  30 unit in 500 mls @ 250 

mls/hr IV TITRATE ROBERT


Nalbuphine HCl (Nubain)  5 mg IVPUSH ASDIRECTED PRN


   PRN Reason: Itching


   Last Admin: 19 16:34 Dose:  5 mg


Octyl Cyanoacrylate (Dermabond Advance) Confirm Administered Dose 2 applic 

.ROUTE .STK-MED ONE


   Stop: 19 07:42


Ropivacaine (Naropin 0.2%) Confirm Administered Dose 20 ml .ROUTE .NexSteppe-MED ONE


   Stop: 19 09:32


   Last Admin: 19 07:34 Dose:  Not Given


Sodium Chloride (Saline Flush)  10 ml FLUSH ASDIRECTED PRN


   PRN Reason: Keep Vein Open


Sodium Chloride (Saline Flush)  2.5 ml FLUSH ASDIRECTED PRN


   PRN Reason: Keep Vein Open











- Exam


General: Reports: Alert, Oriented, Cooperative, No Acute Distress


HEENT: Reports: Other (Normal to gross examination/evaluation)


Neck: Reports: Supple


Lungs: Reports: Other (Breathing comfortably)


GI/Abdominal Exam: Soft, Non-Tender, No Distention


Back Exam: Reports: Other (No CVAT)


Wound/Incisions: Reports: Healing Well, No Drainage

## 2024-08-21 ENCOUNTER — HOSPITAL ENCOUNTER (OUTPATIENT)
Dept: HOSPITAL 56 - MW.SDS | Age: 29
Discharge: HOME | End: 2024-08-21
Attending: SURGERY
Payer: COMMERCIAL

## 2024-08-21 DIAGNOSIS — K21.9: ICD-10-CM

## 2024-08-21 DIAGNOSIS — K29.50: Primary | ICD-10-CM

## 2024-08-21 DIAGNOSIS — F32.A: ICD-10-CM

## 2024-08-21 DIAGNOSIS — F41.9: ICD-10-CM

## 2024-08-21 DIAGNOSIS — K52.9: ICD-10-CM

## 2024-08-21 PROCEDURE — 43239 EGD BIOPSY SINGLE/MULTIPLE: CPT

## 2024-08-21 PROCEDURE — 45380 COLONOSCOPY AND BIOPSY: CPT

## 2024-08-21 PROCEDURE — 81025 URINE PREGNANCY TEST: CPT
